# Patient Record
Sex: MALE | URBAN - METROPOLITAN AREA
[De-identification: names, ages, dates, MRNs, and addresses within clinical notes are randomized per-mention and may not be internally consistent; named-entity substitution may affect disease eponyms.]

---

## 2020-12-13 ENCOUNTER — NURSE TRIAGE (OUTPATIENT)
Dept: ADMINISTRATIVE | Facility: CLINIC | Age: 20
End: 2020-12-13

## 2020-12-14 NOTE — TELEPHONE ENCOUNTER
"Pt has dental pain. He has impacted wisdom teeth. He reports pain in his jaw on both sides, upper and low. He rates the pain as a 7 out of 10 on the pain scale. The patient reports swelling to gums in the back of jaw and swelling to face. Patient denies fever. Pt advised to see dentist in AM. Take Motrin every 6 hours and Tylenol every 4 hours as needed for pain. Call back if symptoms worsen.    Reason for Disposition   [1] Face is swollen AND [2] no fever    Additional Information   Negative: Shock suspected (e.g., cold/pale/clammy skin, too weak to stand, low BP, rapid pulse)   Negative: [1] Similar pain previously AND [2] it was from "heart attack"   Negative: [1] Similar pain previously AND [2] it was from "angina" AND [3] not relieved by nitroglycerin   Negative: Sounds like a life-threatening emergency to the triager   Negative: Tongue is very swollen and tender   Negative: [1] Face is swollen AND [2] fever   Negative: Patient sounds very sick or weak to the triager   Negative: [1] SEVERE pain (e.g., excruciating, unable to do any normal activities) AND [2] not improved 2 hours after pain medicine   Negative: Face is very swollen   Negative: Fever    Protocols used: [id number]-AH      "